# Patient Record
Sex: FEMALE | Race: WHITE
[De-identification: names, ages, dates, MRNs, and addresses within clinical notes are randomized per-mention and may not be internally consistent; named-entity substitution may affect disease eponyms.]

---

## 2020-07-08 ENCOUNTER — HOSPITAL ENCOUNTER (EMERGENCY)
Dept: HOSPITAL 62 - ER | Age: 20
Discharge: LEFT BEFORE DISCHARGE | End: 2020-07-08
Payer: COMMERCIAL

## 2020-07-08 VITALS — DIASTOLIC BLOOD PRESSURE: 62 MMHG | SYSTOLIC BLOOD PRESSURE: 108 MMHG

## 2020-07-08 DIAGNOSIS — S01.311A: Primary | ICD-10-CM
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
## 2020-07-08 PROCEDURE — 99281 EMR DPT VST MAYX REQ PHY/QHP: CPT

## 2020-07-08 NOTE — ER DOCUMENT REPORT
ED Medical Screen (RME)





- General


Chief Complaint: Laceration


Stated Complaint: EAR LACERATION


Time Seen by Provider: 07/08/20 12:55





- HPI


Notes: 





07/08/20 13:03


20-year-old female presents emergency room for a right ear laceration that she 

sustained approximately an hour ago after she accidentally hit her head on the 

headboard while she was wrestling with her boyfriend.  Denies any head trauma 

change in level consciousness.  No active bleeding.  Is unsure of her last 

tetanus.  No over-the-counter medications have been tried.  Denies any hearing 

loss.  Denies any other area of injury.








Vital signs reviewed. 





GENERAL: Well-appearing, well-nourished and in no acute distress.





HEAD: Atraumatic, normocephalic.





NECK: Normal range of motion





CV: Heart regular rate and rhythm





LUNGS: No respiratory distress





skin: Right helix with laceration that goes through the cartilage approximately 

1 cm.  No other area of injury.





MDM:


Patient seen and examined for rapid initial assessment. Vital signs reviewed.  A

comprehensive ED assessment and evaluation of the patient, analysis of test 

results and completion of the medical decision making process will be conducted 

by additional ED providers.





*Note is created using voice recognition software and may contain spelling, 

syntax or grammatical errors.  














- Related Data


Allergies/Adverse Reactions: 


                                        





No Known Allergies Allergy (Verified 07/08/20 12:53)


   











Past Medical History





- Social History


Chew tobacco use (# tins/day): No


Frequency of alcohol use: None


Drug Abuse: None





Physical Exam





- Vital signs


Vitals: 





                                        











Temp Pulse Resp BP Pulse Ox


 


 97.3 F   101 H  18   108/62   100 


 


 07/08/20 12:45  07/08/20 12:45  07/08/20 12:45  07/08/20 12:45  07/08/20 12:45














Course





- Vital Signs


Vital signs: 





                                        











Temp Pulse Resp BP Pulse Ox


 


 97.3 F   101 H  18   108/62   100 


 


 07/08/20 12:53  07/08/20 12:45  07/08/20 12:45  07/08/20 12:45  07/08/20 12:45